# Patient Record
Sex: FEMALE | Race: WHITE | NOT HISPANIC OR LATINO | Employment: FULL TIME | URBAN - METROPOLITAN AREA
[De-identification: names, ages, dates, MRNs, and addresses within clinical notes are randomized per-mention and may not be internally consistent; named-entity substitution may affect disease eponyms.]

---

## 2023-11-14 ENCOUNTER — OFFICE VISIT (OUTPATIENT)
Dept: URGENT CARE | Facility: CLINIC | Age: 43
End: 2023-11-14
Payer: COMMERCIAL

## 2023-11-14 VITALS
HEIGHT: 67 IN | BODY MASS INDEX: 23.07 KG/M2 | DIASTOLIC BLOOD PRESSURE: 60 MMHG | OXYGEN SATURATION: 99 % | HEART RATE: 64 BPM | RESPIRATION RATE: 18 BRPM | TEMPERATURE: 98.4 F | WEIGHT: 147 LBS | SYSTOLIC BLOOD PRESSURE: 114 MMHG

## 2023-11-14 DIAGNOSIS — R51.9 NONINTRACTABLE HEADACHE, UNSPECIFIED CHRONICITY PATTERN, UNSPECIFIED HEADACHE TYPE: ICD-10-CM

## 2023-11-14 DIAGNOSIS — R09.81 SINUS CONGESTION: ICD-10-CM

## 2023-11-14 DIAGNOSIS — R21 RASH AND NONSPECIFIC SKIN ERUPTION: ICD-10-CM

## 2023-11-14 PROCEDURE — 99203 OFFICE O/P NEW LOW 30 MIN: CPT | Performed by: NURSE PRACTITIONER

## 2023-11-14 PROCEDURE — 3074F SYST BP LT 130 MM HG: CPT | Performed by: NURSE PRACTITIONER

## 2023-11-14 PROCEDURE — 3078F DIAST BP <80 MM HG: CPT | Performed by: NURSE PRACTITIONER

## 2023-11-14 RX ORDER — LISINOPRIL 10 MG/1
10 TABLET ORAL DAILY
COMMUNITY
Start: 2023-08-18

## 2023-11-14 RX ORDER — ROSUVASTATIN CALCIUM 5 MG/1
TABLET, COATED ORAL
COMMUNITY
Start: 2023-11-07

## 2023-11-14 RX ORDER — FLUTICASONE PROPIONATE 50 MCG
SPRAY, SUSPENSION (ML) NASAL
Qty: 9.1 ML | Refills: 0 | Status: SHIPPED | OUTPATIENT
Start: 2023-11-14

## 2023-11-14 RX ORDER — TRIAMCINOLONE ACETONIDE 1 MG/G
1 CREAM TOPICAL 2 TIMES DAILY
Qty: 28.4 G | Refills: 0 | Status: SHIPPED | OUTPATIENT
Start: 2023-11-14 | End: 2023-11-21

## 2023-11-14 RX ORDER — INSULIN DETEMIR 100 [IU]/ML
INJECTION, SOLUTION SUBCUTANEOUS
COMMUNITY
Start: 2023-10-20

## 2023-11-14 RX ORDER — HYDROXYZINE HYDROCHLORIDE 25 MG/1
25 TABLET, FILM COATED ORAL EVERY 6 HOURS PRN
Qty: 30 TABLET | Refills: 0 | Status: SHIPPED | OUTPATIENT
Start: 2023-11-14

## 2023-11-14 RX ORDER — METFORMIN HYDROCHLORIDE 750 MG/1
TABLET, EXTENDED RELEASE ORAL
COMMUNITY
Start: 2023-10-26

## 2023-11-14 RX ORDER — KETOROLAC TROMETHAMINE 30 MG/ML
30 INJECTION, SOLUTION INTRAMUSCULAR; INTRAVENOUS ONCE
Status: COMPLETED | OUTPATIENT
Start: 2023-11-14 | End: 2023-11-14

## 2023-11-14 RX ORDER — PREDNISONE 20 MG/1
TABLET ORAL
Qty: 8 TABLET | Refills: 0 | Status: SHIPPED | OUTPATIENT
Start: 2023-11-14

## 2023-11-14 RX ADMIN — KETOROLAC TROMETHAMINE 30 MG: 30 INJECTION, SOLUTION INTRAMUSCULAR; INTRAVENOUS at 11:02

## 2023-11-14 ASSESSMENT — ENCOUNTER SYMPTOMS
HEADACHES: 1
CHILLS: 0
CONSTITUTIONAL NEGATIVE: 1
WEAKNESS: 0
RESPIRATORY NEGATIVE: 1
FEVER: 0
SENSORY CHANGE: 0
CARDIOVASCULAR NEGATIVE: 1
SHORTNESS OF BREATH: 0

## 2023-11-14 ASSESSMENT — VISUAL ACUITY: OU: 1

## 2023-11-14 NOTE — PROGRESS NOTES
Subjective:     Malissa Abdi is a 43 y.o. female who presents for Rash (Face, feet, back, hands;  headache x 2 weeks, itchy)       Rash  This is a new problem. The problem has been gradually worsening since onset. Associated symptoms include congestion. Pertinent negatives include no fever or shortness of breath.     Patient reports about 2 weeks ago, she started to develop a headache.  Headache is frontal and spans to the back of her head.  Has some mild sinus congestion.    About 2 days ago, started to experience itchy red bumps on her back.  Started to spread to other parts of her body including her hands, feet, and today her face.    Denies known allergens or exposure to new substances or insects.  She is traveling from [place].  Reports she travels frequently.    Has taken Excedrin with minimal relief in her headache.    Review of Systems   Constitutional: Negative.  Negative for chills, fever and malaise/fatigue.   HENT:  Positive for congestion.    Respiratory: Negative.  Negative for shortness of breath.    Cardiovascular: Negative.    Skin:  Positive for itching and rash.   Neurological:  Positive for headaches. Negative for sensory change and weakness.   All other systems reviewed and are negative.    Refer to HPI for additional details.    During this visit, appropriate PPE was worn, and hand hygiene was performed.    PMH:  has no past medical history on file.    MEDS:   Current Outpatient Medications:     lisinopril (PRINIVIL) 10 MG Tab, Take 10 mg by mouth every day., Disp: , Rfl:     metFORMIN ER (GLUCOPHAGE XR) 750 MG TABLET SR 24 HR, , Disp: , Rfl:     LEVEMIR FLEXPEN 100 UNIT/ML injection PEN, , Disp: , Rfl:     rosuvastatin (CRESTOR) 5 MG Tab, , Disp: , Rfl:     hydrOXYzine HCl (ATARAX) 25 MG Tab, Take 1 Tablet by mouth every 6 hours as needed for Itching., Disp: 30 Tablet, Rfl: 0    predniSONE (DELTASONE) 20 MG Tab, Take 2 tabs daily x 3 days, then 1 tabs daily x 2 days, Disp: 8 Tablet, Rfl:  "0    triamcinolone acetonide (KENALOG) 0.1 % Cream, Apply 1 Application topically 2 times a day for 7 days., Disp: 28.4 g, Rfl: 0    fluticasone (FLONASE) 50 MCG/ACT nasal spray, 1 spray in each nostril 2 times a day, Disp: 9.1 mL, Rfl: 0    ALLERGIES:   Allergies   Allergen Reactions    Ascorbate      Wheat, chocolate, dairy, soy     SURGHX: History reviewed. No pertinent surgical history.  SOCHX:  reports that she has never smoked. She has never used smokeless tobacco. She reports that she does not currently use alcohol. She reports that she does not use drugs.    FH: Per HPI as applicable/pertinent.      Objective:     /60 (BP Location: Left arm, Patient Position: Sitting, BP Cuff Size: Adult)   Pulse 64   Temp 36.9 °C (98.4 °F) (Temporal)   Resp 18   Ht 1.689 m (5' 6.5\")   Wt 66.7 kg (147 lb)   SpO2 99%   BMI 23.37 kg/m²     Physical Exam  Nursing note reviewed.   Constitutional:       General: She is not in acute distress.     Appearance: She is well-developed. She is not ill-appearing or toxic-appearing.   HENT:      Nose:      Right Sinus: No maxillary sinus tenderness or frontal sinus tenderness.      Left Sinus: No maxillary sinus tenderness or frontal sinus tenderness.   Eyes:      General: Vision grossly intact.   Cardiovascular:      Rate and Rhythm: Normal rate.   Pulmonary:      Effort: Pulmonary effort is normal. No respiratory distress.   Musculoskeletal:         General: No deformity. Normal range of motion.   Skin:     General: Skin is warm and dry.      Coloration: Skin is not pale.      Findings: Rash present.      Comments: Erythematous, urticarial and papular lesions on back, hands, and face; small abrasions from scratching vs possible insect bites   Neurological:      Mental Status: She is alert and oriented to person, place, and time.      GCS: GCS eye subscore is 4. GCS verbal subscore is 5. GCS motor subscore is 6.      Motor: No weakness.   Psychiatric:         Mood and " Affect: Mood normal.         Behavior: Behavior normal. Behavior is cooperative.         Thought Content: Thought content normal.         Judgment: Judgment normal.       Assessment/Plan:     1. Rash and nonspecific skin eruption  - hydrOXYzine HCl (ATARAX) 25 MG Tab; Take 1 Tablet by mouth every 6 hours as needed for Itching.  Dispense: 30 Tablet; Refill: 0  - predniSONE (DELTASONE) 20 MG Tab; Take 2 tabs daily x 3 days, then 1 tabs daily x 2 days  Dispense: 8 Tablet; Refill: 0  - triamcinolone acetonide (KENALOG) 0.1 % Cream; Apply 1 Application topically 2 times a day for 7 days.  Dispense: 28.4 g; Refill: 0    2. Nonintractable headache, unspecified chronicity pattern, unspecified headache type  - ketorolac (Toradol) injection 30 mg    3. Sinus congestion  - fluticasone (FLONASE) 50 MCG/ACT nasal spray; 1 spray in each nostril 2 times a day  Dispense: 9.1 mL; Refill: 0    Rx as above sent electronically.  Identify/avoid possible allergens/irritants.  Avoid further NSAIDs such as ibuprofen for the next 24 hours.    Monitor. Warning signs reviewed. Return precautions advised.    Differential diagnosis, natural history, supportive care, over-the-counter symptom management per 's instructions, close monitoring, and indications for immediate follow-up discussed.     All questions answered. Patient agrees with the plan of care.    Discharge summary provided.